# Patient Record
Sex: FEMALE | URBAN - METROPOLITAN AREA
[De-identification: names, ages, dates, MRNs, and addresses within clinical notes are randomized per-mention and may not be internally consistent; named-entity substitution may affect disease eponyms.]

---

## 2022-03-29 ENCOUNTER — APPOINTMENT (RX ONLY)
Dept: URBAN - METROPOLITAN AREA CLINIC 90 | Facility: CLINIC | Age: 52
Setting detail: DERMATOLOGY
End: 2022-03-29

## 2022-03-29 DIAGNOSIS — Z41.9 ENCOUNTER FOR PROCEDURE FOR PURPOSES OTHER THAN REMEDYING HEALTH STATE, UNSPECIFIED: ICD-10-CM

## 2022-03-29 PROCEDURE — ? ADDITIONAL NOTES

## 2022-03-29 PROCEDURE — ? COSMETIC CONSULTATION: HYDRAFACIAL

## 2022-03-29 NOTE — PROCEDURE: ADDITIONAL NOTES
Additional Notes: The patient is present due to concerns of dry skin, pore congestion, and a dull appearance. I informed the patient that she would most benefit from a series of 3 Platinum HydraFacials performed 1 month apart for effective treatment of her cosmetic concerns. I informed the patient of [facility] special where all Deluxe HydraFacials will be upgraded to [place] HydraFacials and receive a lip or eye perk at no additional cost. The patient stated that she is interested in taking part of special and is scheduled for 3/30.
Detail Level: Simple
Render Risk Assessment In Note?: no

## 2022-03-30 ENCOUNTER — APPOINTMENT (RX ONLY)
Dept: URBAN - METROPOLITAN AREA CLINIC 90 | Facility: CLINIC | Age: 52
Setting detail: DERMATOLOGY
End: 2022-03-30

## 2022-03-30 DIAGNOSIS — Z41.9 ENCOUNTER FOR PROCEDURE FOR PURPOSES OTHER THAN REMEDYING HEALTH STATE, UNSPECIFIED: ICD-10-CM

## 2022-03-30 PROCEDURE — ? HYDRAFACIAL

## 2022-03-30 NOTE — PROCEDURE: HYDRAFACIAL
Price (Use Numbers Only, No Special Characters Or $): 098 Price (Use Numbers Only, No Special Characters Or $): 719

## 2022-03-30 NOTE — PROCEDURE: HYDRAFACIAL
Comments: [facility] special all Deluxe HydraFacials upgraded to platinum HydraFacials with Lip perk at no additional cost.